# Patient Record
Sex: FEMALE | Race: WHITE | NOT HISPANIC OR LATINO | URBAN - METROPOLITAN AREA
[De-identification: names, ages, dates, MRNs, and addresses within clinical notes are randomized per-mention and may not be internally consistent; named-entity substitution may affect disease eponyms.]

---

## 2017-09-29 ENCOUNTER — CONVERSION ENCOUNTER (OUTPATIENT)
Dept: MAMMOGRAPHY | Facility: HOSPITAL | Age: 52
End: 2017-09-29

## 2020-05-21 ENCOUNTER — HOSPITAL ENCOUNTER (OUTPATIENT)
Dept: OTHER | Facility: HOSPITAL | Age: 55
Discharge: HOME | End: 2020-05-21

## 2020-05-21 ENCOUNTER — OFFICE VISIT CONVERTED (OUTPATIENT)
Dept: OTHER | Facility: HOSPITAL | Age: 55
End: 2020-05-21
Attending: NURSE PRACTITIONER

## 2020-05-24 LAB — SARS-COV-2 RNA SPEC QL NAA+PROBE: NOT DETECTED

## 2021-05-10 NOTE — H&P
History and Physical      Patient Name: Julieta Sylvester   Patient ID: [id number]   Sex: Female   YOB: 1965    Referring Provider: Maricn Foley MD    Visit Date: May 21, 2020    Provider: BLAS Aguilar   Location[facility]   Location Address: [address] [place] [address]  [id number]   Location Phone: [phone]          Chief Complaint  · Evaluation for Respiratory Virus      History Of Present Illness  Julieta Sylvester is a 54 year old /White female who presents today to the clinic for evaluation of a respiratory virus.   Date of Onset: 05/19/2020   What Symptoms: cough, fever, and headache   Quality of Symptoms: Dry cough, T-max 99.6   Anything make it worse or better: No over-the-counter medications   Severity of Symptoms: Mild to moderate   Any known Exposure to COVID-19: No known exposure, patient is a teacher at [employer]      Patient presents the respiratory virus evaluation center with dry cough, fever, headache for the past 3 days.  Patient is a 83-year-old mother and is concerned for COVID-19.  Patient states that she did work in the school this past week but was in her own room and  from other teachers.       Past Medical History  Aftercare following Left total knee replacement; Arthritis; Hyperlipemia; Hypertension; Seasonal allergies         Past Surgical History  Artificial Joints/Limbs; Hysterectomy; Joint Surgery         Allergy List  NO KNOWN DRUG ALLERGIES         Family Medical History  Diabetes, unspecified type; Family history of certain chronic disabling diseases; arthritis         Social History  Alcohol Use (Never); .; lives with children; Recreational Drug Use (Never); Tobacco (Never); Working         Review of Systems  · Constitutional  o Admits  o : fever  o Denies  o : fatigue, weight gain, weight loss  · HENT  o Admits  o : headaches  o Denies  o : vertigo, recent head  injury  · Respiratory  o Admits  o : dry cough, pneumonia, cough  o Denies  o : shortness of breath, productive cough, COPD, wheezing, asthma  · Gastrointestinal  o Denies  o : nausea, vomiting, diarrhea, constipation, abdominal pain  · Integument  o Denies  o : rash  · Neurologic  o Admits  o : headache      Vitals  Date Time BP Position Site L\R Cuff Size HR RR TEMP (F) WT  HT  BMI kg/m2 BSA m2 O2 Sat        05/21/2020 01:09 PM      101 - R  98.9     95 %          Physical Examination  · Constitutional  o Appearance  o : no acute distress, well-nourished  · Head and Face  o Head  o :   § Inspection  § : atraumatic, normocephalic  · Eyes  o Eyes  o : extraocular movements intact, no scleral icterus, no conjunctival injection  · Ears, Nose, Mouth and Throat  o Ears  o :   § External Ears  § : normal  § Otoscopic Examination  § : normal tympanic membrane exam  o Nose  o :   § Intranasal Exam  § : sinuses nontender to palpation  o Oral Cavity  o :   § Oral Mucosa  § : moist mucous membranes  o Throat  o :   § Oropharynx  § : normal tonsils, normal oropharynx  · Respiratory  o Respiratory Effort  o : breathing comfortably, symmetric chest rise  o Auscultation of Lungs  o : clear to asculatation bilaterally, no wheezes, rales, or rhonchii  · Cardiovascular  o Heart  o :   § Auscultation of Heart  § : regular rate and rhythm, no murmurs, rubs, or gallops  o Peripheral Vascular System  o :   § Extremities  § : no edema  · Lymphatic  o Neck  o : no lymphadenopathy present  o Supraclavicular Nodes  o : no supraclavicular nodes  · Skin and Subcutaneous Tissue  o General Inspection  o : normal inspection  · Neurologic  o Mental Status Examination  o :   § Orientation  § : grossly oriented to person, place and time  o Gait and Station  o :   § Gait Screening  § : normal gait  · Psychiatric  o General  o : normal mood and  affect              Assessment  · Cough     786.2/R05  · Fever     780.60/R50.9  · Headache     784.0/R51  · Acute bronchitis     466.0/J20.9  Patient presented with symptoms of viral respiratory infection. Advised to drink plenty of fluids, run a cool-mist humidifier in room at night, gargle salt water for sore throat, and get plenty of rest. Patient should avoid over-exertion and reduce exposure to irritants such as smoke, cold, dry air, and dust.  Treatment currently involves symptomatic relief. Patient may take acetaminophen as directed to reduce fever and body aches.   Patient understood these instructions and will follow up in the office if symptoms not improving.   Educated patient that I cannot exclude Covid-19 as a diagnosis. Patient was in tier 3 for covid testing and was tested today. Patient will be notified of results in 24 hrs. Educated patient on self quarantine and self-monitoring. Patient given work note and education provided by the CDC.      Plan  · Orders  o [facility] NCOV2 (send-out) ([id number]) - 786.2/R05, 784.0/R51, 780.60/R50.9 - 05/21/2020  · Instructions  o Chronic conditions reviewed and taken into consideration for today's treatment plan.   o Plan of Care: covid testing  o Patient instructed to seek medical attention urgently for new or worsening symptoms.  o Patient was educated on their diagnosis, treatment, and medications today.   o Recommend self monitoring. Instructions given.   o Self-monitoring for 14 days. Instructions given.  o Stay home until without fever for 72 hours without using fever-reducing medications and other symptoms are gone.  o Electronically Identified Patient Education Materials Provided Electronically  · Disposition  o Call or Return if symptoms worsen or persist.  o As Needed for Follow Up            Electronically Signed by: Carissa Reece, APRN -Author on May 21, 2020 01:24:35 PM

## 2021-05-15 VITALS — TEMPERATURE: 98.9 F | HEART RATE: 101 BPM | OXYGEN SATURATION: 95 %
